# Patient Record
Sex: MALE | Race: WHITE | NOT HISPANIC OR LATINO | Employment: FULL TIME | URBAN - METROPOLITAN AREA
[De-identification: names, ages, dates, MRNs, and addresses within clinical notes are randomized per-mention and may not be internally consistent; named-entity substitution may affect disease eponyms.]

---

## 2018-06-17 ENCOUNTER — HOSPITAL ENCOUNTER (EMERGENCY)
Facility: HOSPITAL | Age: 38
Discharge: HOME | End: 2018-06-17
Attending: EMERGENCY MEDICINE | Admitting: EMERGENCY MEDICINE
Payer: OTHER GOVERNMENT

## 2018-06-17 VITALS
DIASTOLIC BLOOD PRESSURE: 82 MMHG | SYSTOLIC BLOOD PRESSURE: 131 MMHG | RESPIRATION RATE: 16 BRPM | TEMPERATURE: 98.6 F | BODY MASS INDEX: 26.66 KG/M2 | HEART RATE: 73 BPM | HEIGHT: 69 IN | OXYGEN SATURATION: 97 % | WEIGHT: 180 LBS

## 2018-06-17 DIAGNOSIS — B08.4 HAND, FOOT AND MOUTH DISEASE: Primary | ICD-10-CM

## 2018-06-17 PROCEDURE — 99283 EMERGENCY DEPT VISIT LOW MDM: CPT

## 2018-06-17 NOTE — ED PROVIDER NOTES
History  Chief Complaint   Patient presents with    Generalized Body Aches     Pt c/o feeling "strange" for about a week, states daughter was sent home from school with a fever and associtated with that  Began feeling "sensitivity to heat in hands and "pain in feet " Today states pain in feet still there and "red spots" all over body  Took Benadryl last night     40-year-old male with no significant past medical history presents to the emergency department with chief complaint of painful blistering rash to his hands and his feet  Onset of symptoms reported as 5 days ago  Location of symptoms reported as the bilateral hands and feet  Quality is reported as painful blistering rash  Severity is reported as moderate  Associated symptoms:  Positive for fever which has resolved  Positive for sore throat which has resolved  Denies chest pain  Denies headache  Denies upper or lower extremity paralysis paresthesias or weakness  Denies nausea or vomiting  Modifying factors:  Patient reports washing the dishes and putting his hands in the sink water in addition to running a few days ago caused worsening of the pain  Context:  Patient denies any prior similar episodes in the past   He reports his daughter came home last week with a fever and sore throat that lasted for 1 day and then resolved  She has been asymptomatic since  Patient reports he started with a fever and sore throat for 2 days in the beginning of the week, also states that the skin of his hands and feet felt strange but he had no rash at that time  The fever and sore throat resolved however for the past 3 days he has noticed blistering rash to his hands and his feet  It seems to be spreading  He denies any new soaps, detergents or shampoos  Denies any new foods or medications by mouth  Medical summary:  Reviewed past visits via [facility]:  No prior visits to this emergency department          History provided by:  Patient   used: No        None       Past Medical History:   Diagnosis Date    Herniated cervical disc     5,6       History reviewed  No pertinent surgical history  History reviewed  No pertinent family history  I have reviewed and agree with the history as documented  Social History   Substance Use Topics    Smoking status: Never Smoker    Smokeless tobacco: Not on file    Alcohol use No        Review of Systems   Constitutional: Positive for fever  Negative for activity change, appetite change, chills, diaphoresis, fatigue and unexpected weight change  HENT: Positive for sore throat  Negative for congestion, dental problem, drooling, ear discharge, ear pain, facial swelling, hearing loss, mouth sores, nosebleeds, postnasal drip, rhinorrhea, sinus pain, sinus pressure, sneezing, tinnitus, trouble swallowing and voice change  Eyes: Negative for photophobia, pain, discharge, redness, itching and visual disturbance  Respiratory: Negative for apnea, cough, choking, chest tightness, shortness of breath, wheezing and stridor  Cardiovascular: Negative for chest pain, palpitations and leg swelling  Gastrointestinal: Negative for abdominal distention, abdominal pain, anal bleeding, blood in stool, constipation, diarrhea, nausea, rectal pain and vomiting  Endocrine: Negative for cold intolerance, heat intolerance, polydipsia, polyphagia and polyuria  Genitourinary: Negative for difficulty urinating, dysuria, flank pain, frequency, hematuria and urgency  Musculoskeletal: Negative for arthralgias, back pain, gait problem, joint swelling, myalgias, neck pain and neck stiffness  Skin: Positive for rash  Negative for color change, pallor and wound  Allergic/Immunologic: Negative for environmental allergies, food allergies and immunocompromised state  Neurological: Negative for dizziness, tremors, seizures, syncope, facial asymmetry, speech difficulty, weakness, light-headedness, numbness and headaches  Hematological: Negative for adenopathy  Does not bruise/bleed easily  Psychiatric/Behavioral: Negative for agitation, confusion and hallucinations  The patient is not nervous/anxious  All other systems reviewed and are negative  Physical Exam  Physical Exam   Constitutional: He is oriented to person, place, and time  He appears well-developed and well-nourished  No distress  /82 (BP Location: Right arm)   Pulse 73   Temp 98 6 °F (37 °C) (Oral)   Resp 16   Ht 5' 9" (1 753 m)   Wt 81 6 kg (180 lb)   SpO2 97%   BMI 26 58 kg/m²    HENT:   Head: Normocephalic and atraumatic  Right Ear: External ear normal    Left Ear: External ear normal    Nose: Nose normal    Mouth/Throat: Oropharynx is clear and moist  No oropharyngeal exudate  Eyes: Conjunctivae and EOM are normal  Pupils are equal, round, and reactive to light  Right eye exhibits no discharge  Left eye exhibits no discharge  No scleral icterus  Neck: Normal range of motion  Neck supple  No JVD present  No tracheal deviation present  No thyromegaly present  Cardiovascular: Normal rate, regular rhythm and intact distal pulses  Pulmonary/Chest: Effort normal and breath sounds normal  No stridor  No respiratory distress  He has no wheezes  He has no rales  He exhibits no tenderness  Abdominal: Soft  Bowel sounds are normal  He exhibits no distension and no mass  There is no tenderness  There is no rebound and no guarding  Musculoskeletal: Normal range of motion  He exhibits no edema, tenderness or deformity  Lymphadenopathy:     He has no cervical adenopathy  Neurological: He is alert and oriented to person, place, and time  He displays normal reflexes  No cranial nerve deficit or sensory deficit  He exhibits normal muscle tone  Coordination normal    Skin: Skin is warm and dry  Capillary refill takes less than 2 seconds  Rash noted  He is not diaphoretic  No erythema  No pallor     Multiple small raised circular vesicular blisters present to the palms and dorsum of the bilateral hands as well as the plantar and dorsal surfaces of the bilateral feet  No petechiae  No purpura  The chest, back, arms and legs and face are spared  There are no intraoral lesions  Psychiatric: He has a normal mood and affect  His behavior is normal  Judgment and thought content normal    Nursing note and vitals reviewed  Vital Signs  ED Triage Vitals [06/17/18 1128]   Temperature Pulse Respirations Blood Pressure SpO2   98 6 °F (37 °C) 73 16 131/82 97 %      Temp Source Heart Rate Source Patient Position - Orthostatic VS BP Location FiO2 (%)   Oral Monitor Sitting Right arm --      Pain Score       5           Vitals:    06/17/18 1128   BP: 131/82   Pulse: 73   Patient Position - Orthostatic VS: Sitting       Visual Acuity      ED Medications  Medications - No data to display    Diagnostic Studies  Results Reviewed     None                 No orders to display              Procedures  Procedures       Phone Contacts  ED Phone Contact    ED Course                               MDM  Number of Diagnoses or Management Options  Hand, foot and mouth disease: new and does not require workup  Diagnosis management comments: Differential diagnosis includes but is not limited to out this ulcers, insect bites, allergic reaction, varicella zoster virus, eczema, contact or allergic dermatitis, guttate psoriasis, hand-foot-mouth disease, considered but doubt disseminated meningococcus  Long discussion with patient based upon distribution of rash, classic appearance of rash symptoms appear most consistent with hand-foot-mouth disease  I discussed with the patient this is a virus  There is no antiviral therapy that is known to shorten the course  Antibiotics are not indicated for treatment  There is no evidence of any overlying staph skin infection    Patient demonstrates no clinical findings to suggest encephalitis, meningitis, flaccid paralysis or myocarditis  No indication for admission  I discussed the normal course of this viral syndrome  Discussed use of Tylenol or ibuprofen for pain control  Discussed use of topical lidocaine for rash pain treatment although I cautioned patient this may provide little relief  We discussed the etiology of this syndrome as well as possible sources of transmission  Discussed follow up with primary care physician in 3-5 days for recheck  Reviewed reasons to return to ed  Patient verbalized understanding of diagnosis and agreement with discharge plan of care as well as understanding of reasons to return to ed  Amount and/or Complexity of Data Reviewed  Review and summarize past medical records: yes    Patient Progress  Patient progress: stable    CritCare Time    Disposition  Final diagnoses:   Hand, foot and mouth disease     Time reflects when diagnosis was documented in both MDM as applicable and the Disposition within this note     Time User Action Codes Description Comment    6/17/2018 12:09 PM Bhakti Diego Add [B08 4] Hand, foot and mouth disease       ED Disposition     ED Disposition Condition Comment    Discharge  [facility]  discharge to home/self care      Condition at discharge: Stable        Follow-up Information     Follow up With Specialties Details Why Contact Info Additional Information    Kay Hurst MD Family Medicine Call in 1 day for further evaluation of symptoms [address] [facility],[address]  [address] [facility] Emergency Medicine Go to If symptoms worsen [address] [facility] [address] [place], [place], [address]          Discharge Medication List as of 6/17/2018 12:11 PM      START taking these medications    Details   lidocaine 4 % topical gel Apply topically 4 (four) times a day as needed (pain), Starting Sun 6/17/2018, Print           No discharge procedures on file      ED Provider  Electronically Signed by           Sebas Rivera PA-C  06/17/18 5562

## 2018-06-17 NOTE — DISCHARGE INSTRUCTIONS
Hand, Foot, and Mouth Disease   WHAT YOU NEED TO KNOW:   Hand, foot, and mouth disease (HFMD) is an infection caused by a virus  HFMD is easily spread from person to person through direct contact  Anyone can get HFMD, but it is most common in children younger than 10 years  DISCHARGE INSTRUCTIONS:   Medicines:   · Mouthwash: Your healthcare provider may give you special mouthwash to help relieve mouth pain caused by the sores  · Acetaminophen  decreases pain and fever  It is available without a doctor's order  Ask how much to take and how often to take it  Follow directions  Read the labels of all other medicines you are using to see if they also contain acetaminophen, or ask your doctor or pharmacist  Acetaminophen can cause liver damage if not taken correctly  Do not use more than 4 grams (4,000 milligrams) total of acetaminophen in one day  · NSAIDs , such as ibuprofen, help decrease swelling, pain, and fever  This medicine is available with or without a doctor's order  NSAIDs can cause stomach bleeding or kidney problems in certain people  If you take blood thinner medicine, always ask if NSAIDs are safe for you  Always read the medicine label and follow directions  Do not give these medicines to children under 10months of age without direction from your child's healthcare provider  · Take your medicine as directed  Contact your healthcare provider if you think your medicine is not helping or if you have side effects  Tell him of her if you are allergic to any medicine  Keep a list of the medicines, vitamins, and herbs you take  Include the amounts, and when and why you take them  Bring the list or the pill bottles to follow-up visits  Carry your medicine list with you in case of an emergency  Drink liquids:  Drink at least 9 cups of liquid each day to prevent dehydration  One cup is 8 ounces  Water and milk are good choices because they will not irritate your mouth or throat    Follow up with your healthcare provider as directed:  Write down your questions so you remember to ask them during your visits  Prevent the spread of hand, foot, and mouth disease: You can spread the virus for weeks after your symptoms have gone away  The following can help prevent the spread of HFMD:  · Wash your hands often  Use soap and water  Wash your hands after you use the bathroom, change a child's diapers, or sneeze  Wash your hands before you prepare or eat food  · Avoid close contact with others:  Do not kiss, hug, or share food or drink  Ask your child's school or  if you need to keep your child home while he has symptoms of HFMD      · Clean surfaces well:  Wash all items and surfaces with diluted bleach  This includes toys, tables, counter tops, and door knobs  Contact your healthcare provider if:   · Your mouth or throat are so sore you cannot eat or drink  · Your fever, sore throat, mouth sores, or rash do not go away after 10 days  · You have questions about your condition or care  Return to the emergency department if:   · You urinate less than normal or not at all  · You have a severe headache, stiff neck, and back pain  · You have trouble moving, or cannot move part of your body  · You become confused and sleepy  · You have trouble breathing, are breathing very fast, or you cough up pink, foamy spit  · You have a seizure  · You have a high fever and your heart is beating much faster than it normally does  © 2017 [address] Information is for End User's use only and may not be sold, redistributed or otherwise used for commercial purposes  All illustrations and images included in CareNotes® are the copyrighted property of [employer]  or [employer]  The above information is an  only  It is not intended as medical advice for individual conditions or treatments   Talk to your doctor, nurse or pharmacist before following any medical regimen to see if it is safe and effective for you